# Patient Record
Sex: FEMALE | Race: WHITE | NOT HISPANIC OR LATINO | URBAN - METROPOLITAN AREA
[De-identification: names, ages, dates, MRNs, and addresses within clinical notes are randomized per-mention and may not be internally consistent; named-entity substitution may affect disease eponyms.]

---

## 2019-11-19 ENCOUNTER — OUTPATIENT (OUTPATIENT)
Dept: OUTPATIENT SERVICES | Facility: HOSPITAL | Age: 19
LOS: 1 days | Discharge: HOME | End: 2019-11-19

## 2019-12-12 DIAGNOSIS — F43.10 POST-TRAUMATIC STRESS DISORDER, UNSPECIFIED: ICD-10-CM

## 2019-12-19 DIAGNOSIS — G47.00 INSOMNIA, UNSPECIFIED: ICD-10-CM

## 2020-09-17 ENCOUNTER — OUTPATIENT (OUTPATIENT)
Dept: OUTPATIENT SERVICES | Facility: HOSPITAL | Age: 20
LOS: 1 days | Discharge: HOME | End: 2020-09-17

## 2020-09-18 DIAGNOSIS — F43.10 POST-TRAUMATIC STRESS DISORDER, UNSPECIFIED: ICD-10-CM

## 2020-09-18 DIAGNOSIS — G47.00 INSOMNIA, UNSPECIFIED: ICD-10-CM

## 2021-02-04 ENCOUNTER — EMERGENCY (EMERGENCY)
Facility: HOSPITAL | Age: 21
LOS: 1 days | Discharge: HOME | End: 2021-02-04
Attending: EMERGENCY MEDICINE | Admitting: EMERGENCY MEDICINE
Payer: COMMERCIAL

## 2021-02-04 VITALS
OXYGEN SATURATION: 100 % | HEART RATE: 97 BPM | SYSTOLIC BLOOD PRESSURE: 137 MMHG | TEMPERATURE: 98 F | DIASTOLIC BLOOD PRESSURE: 90 MMHG | RESPIRATION RATE: 18 BRPM

## 2021-02-04 VITALS
SYSTOLIC BLOOD PRESSURE: 127 MMHG | RESPIRATION RATE: 18 BRPM | DIASTOLIC BLOOD PRESSURE: 84 MMHG | HEART RATE: 101 BPM | OXYGEN SATURATION: 100 %

## 2021-02-04 DIAGNOSIS — F41.0 PANIC DISORDER [EPISODIC PAROXYSMAL ANXIETY]: ICD-10-CM

## 2021-02-04 PROCEDURE — 90792 PSYCH DIAG EVAL W/MED SRVCS: CPT

## 2021-02-04 PROCEDURE — 99284 EMERGENCY DEPT VISIT MOD MDM: CPT

## 2021-02-04 RX ORDER — CLONAZEPAM 1 MG
1 TABLET ORAL ONCE
Refills: 0 | Status: DISCONTINUED | OUTPATIENT
Start: 2021-02-04 | End: 2021-02-04

## 2021-02-04 RX ADMIN — Medication 2 MILLIGRAM(S): at 05:39

## 2021-02-04 RX ADMIN — Medication 1 MILLIGRAM(S): at 04:38

## 2021-02-04 NOTE — ED ADULT NURSE NOTE - CHIEF COMPLAINT QUOTE
Patient had a verbal argument with her roommate, drove herself to [facility] to talk to someone about her anxiety and was advised to go to [facility].  Patient drove away from the hospital and called 911.  EMS brought patient to hospital for anxiety.  She is emotional and anxious in ambay.  Denies SI/HI, hearing voices or hallucinations. PPatient had a verbal argument with her roommate, drove herself to [facility] to talk to someone about her anxiety and was advised to go to [facility].  Patient drove away from the hospital and called 911.  EMS brought patient to hospital for anxiety.  She is emotional and anxious in ambay.  Denies SI/HI, hearing voices or hallucinations.

## 2021-02-04 NOTE — ED ADULT NURSE NOTE - NSIMPLEMENTINTERV_GEN_ALL_ED
Implemented All Universal Safety Interventions:  [place] to call system. Call bell, personal items and telephone within reach. Instruct patient to call for assistance. Room bathroom lighting operational. Non-slip footwear when patient is off stretcher. Physically safe environment: no spills, clutter or unnecessary equipment. Stretcher in lowest position, wheels locked, appropriate side rails in place.

## 2021-02-04 NOTE — ED ADULT NURSE NOTE - OBJECTIVE STATEMENT
Receive pt in Trauma C, pt very anxious, pt states " I don't want to answer any questions that is going to let me stay here". As per  Triage  note, patient had a verbal argument with her roommate, drove herself to [facility] to talk to someone about her anxiety and was advised to go to [facility].  Patient drove away from the hospital and called 911.   Denies SI/HI, hearing voices or hallucinations." Pt. medicated for Klonopin. Receive pt in Trauma C, pt very anxious, pt states " I don't want to answer any questions that is going to let me stay here". As per  Triage  note, patient had a verbal argument with her roommate, drove herself to [facility] to talk to someone about her anxiety and was advised to go to [facility].  Patient drove away from the hospital and called 911.   Denies SI/HI, hearing voices or hallucinations." Pt. medicated for Klonopin. partial belonging placed in  the closet in front of room 21. partial valuables taken and sent to security. AS per Md Wan pt could keep her cellphone for now.

## 2021-02-04 NOTE — ED BEHAVIORAL HEALTH ASSESSMENT NOTE - RISK ASSESSMENT
pt is at moderate to low risk, and  not at imminent risk for self harm. Pt has risk factors including anxious state, prior sa, character pathology, hx of elf injurious behaviors, chronic si. Protective factors are: supportive network, engaged in treatment, help seeking behavior, compliant with treatment, no access to weapons, working, future oriented .  While pt is chornically at elevated risk fr self harm she currently is not a imminent danger and does not require psychiatric involuntary co Moderate Acute Suicide Risk pt is at moderate to low risk, and  not at imminent risk for self harm. Pt has risk factors including anxious state, prior sa, character pathology, hx of elf injurious behaviors, chronic si. Protective factors are: supportive network, engaged in treatment, help seeking behavior, compliant with treatment, no access to weapons, working, future oriented .  While pt is chronically at elevated risk fr self harm she currently is not a imminent danger and does not require psychiatric involuntary commitment .
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
## 2021-02-04 NOTE — ED BEHAVIORAL HEALTH ASSESSMENT NOTE - DESCRIPTION
pt is anxious pt is anxious, but overall in good behavioral control    Vital Signs Last 24 Hrs  T(C): 36.8 (04 Feb 2021 03:54), Max: 36.8 (04 Feb 2021 03:54)  T(F): 98.3 (04 Feb 2021 03:54), Max: 98.3 (04 Feb 2021 03:54)  HR: 101 (04 Feb 2021 05:43) (97 - 101)  BP: 127/84 (04 Feb 2021 05:43) (127/84 - 137/90)  BP(mean): --  RR: 18 (04 Feb 2021 05:43) (18 - 18)  SpO2: 100% (04 Feb 2021 05:43) (100% - 100%) none estranged from her family, lives with a roommate . Pt is a senior in college at [employer]

## 2021-02-04 NOTE — ED PROVIDER NOTE - PATIENT PORTAL LINK FT
You can access the FollowMyHealth Patient Portal offered by [facility] by registering at the following website: [email or web address]. By joining [facility]’s FollowMyHealth portal, you will also be able to view your health information using other applications (apps) compatible with our system.

## 2021-02-04 NOTE — ED BEHAVIORAL HEALTH ASSESSMENT NOTE - SUMMARY
Pt. 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 Pt  with  3 psychiatric admissions, the first when she was 15 in [place] following and SA via OD, the second at [facility] in 10/2018 after OD, and more recently in 2/2020 at [facility]. Pt. denied any other SA. Pt. reported NSSIB of cutting on and off since high school and last cut couple of weeks ago. Pt. reported that she is in treatment with Dr. Perez and therapist Cathy Smith and currently prescribed quetiapine, effexor, Prazosin, and Klonopin. Patient was seen at [facility] in 2019 for medication refill and  then again in  in September, 2020  for si . Patient bib by EMS for anxiety . Pt. 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 Pt  with  3 psychiatric admissions, the first when she was 15 in [place] following and SA via OD, the second at [facility] in 10/2018 after OD, and more recently in 2/2020 at [facility]. Pt. denied any other SA. Pt. reported NSSIB of cutting on and off since high school and last cut couple of weeks ago. Pt. reported that she is in treatment with Dr. Perez and therapist Cathy Smith and currently prescribed quetiapine, effexor, Prazosin, and Klonopin. Patient was seen at [facility] in 2019 for medication refill and  then again in  in September, 2020  for si . Patient bib by EMS for anxiety .  On assessment pt presents  anxious initially , with rapid speech , tearful in the context of multiple stressors which precipitated in having si. Pt while in ED was medicated with Klonopin and Ativan PO for anxiety with good response . Once anxiety subsided she denied passive or active si . She was fernando to engage in safety plan and verbalized several  protective factors her work, her school . Pt declines voluntary admission , and did not meet criteria for involuntary hospitalization. pt was deemed safe for discharge with no imminent safety concern.

## 2021-02-04 NOTE — ED BEHAVIORAL HEALTH ASSESSMENT NOTE - HPI (INCLUDE ILLNESS QUALITY, SEVERITY, DURATION, TIMING, CONTEXT, MODIFYING FACTORS, ASSOCIATED SIGNS AND SYMPTOMS)
Pt. 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 Pt. reported 3 psychiatric admissions, the first when she was 15 in [place] following and SA via OD, the second at [facility] in 10/2018 after OD, and more   recently in 2/2020 at [facility]. Pt. denied any other SA. Pt. reported NSSIB of cutting on and off since high   school and last cut 3 days ago. Pt. reported that she is in treatment with Dr. Perez and therapist Cathy Smith   and currently prescribed quetiapine, effexor, prazosin, and klonopin. Pt. reported that her psychiatrist stated   that her symptoms are personality related and that he has made many medications changes and will not make   anymore. Pt. presented to the [facility] today for treatment as she reported that depression and anxiety have   worsened. Pt. reported SI daily with plans of OD or jumping off a bridge but she denied all intent and listed   her friends as protective factors. Pt .completed safety plan.  Pt. declined admissions to inpatient services. Pt.   denied AH,VH, mikayla. Pt. denied any HI,I,P    as per Elizabeth Rosales, knows the pt for a long time , live together. Pt has panic d/o, and at times gets heightened. Patient is in treatment with therapist  and psychiatrist. She feels pt tends to get more anxious on the days when pt has therapy sessions and does not think Klonopin control and feels pt was seeking for medications fix . Patient relies heavily on Elizabeth's support and they got into an argument, over Elizabeth's boyfriend and afraid that he is pulling her away from the pt. Patient has hx of si , but no recent verbalization of suicidal thoughts. No significant changes in her daily activities or functioning , pt has been working and seems busy with paper work.  She reports pt has hx of hospitalizations , not sure how many, but most recent at [facility] for 2 days for anxiety . Dr. Perez .  Patient vapes daily, denies any drinking. No acces to guns.    She reports has childhood trauma. Pt. 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 Pt  with  3 psychiatric admissions, the first when she was 15 in [place] following and SA via OD, the second at [facility] in 10/2018 after OD, and more recently in 2/2020 at [facility]. Pt. denied any other SA. Pt. reported NSSIB of cutting on and off since high school and last cut couple of weeks ago. Pt. reported that she is in treatment with Dr. Perez and therapist Cathy Smith and currently prescribed quetiapine, effexor, Prazosin, and Klonopin. Patient was seen at [facility] in 2019 for medication refill and  then again in  in September, 2020  for si . Patient bib by EMS for anxiety .    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 Patient reports she is under a lot of stress inclduing school , missed her deadline due to issues with financial aid, and having difficult time in therapy due to intense sessions in therapy. Patient also reports her roommate, Elizabeth, whom she considers as  mother figure was having a hard time with the pt and asked  to take a break from her and withdrew herself . Pt admits she is attached to Elizabeth and fearful of loosing her as Elizabeth has a boyfriend now and feels he is pulling her away from the pt. Patient reports she left the house after having an argument with Elizabeth , and was driving around for 2 hours and could not calm herself down, she drove to [facility] and asked the  where she can be seen and they called EMS. Now that she is here she feels more anxious and she does not want to be admitted, she states she had many of these kind of melt downs and susually would just get something in ER and was able to go home. She states Klonopin 0.5mg does not help and is not enough when she has these anxiety attacks.  denied AH,VH, mikayla. Pt. denied any HI,I,P    as per Elizabeth Rosales, knows the pt for a long time , live together. Pt has panic d/o, and at times gets heightened. Patient is in treatment with therapist  and psychiatrist. She feels pt tends to get more anxious on the days when pt has therapy sessions and does not think Klonopin control and feels pt was seeking for medications fix . Patient relies heavily on Elizabeth's support and they got into an argument, over Elizabeth's boyfriend and afraid that he is pulling her away from the pt. Patient has hx of si , but no recent verbalization of suicidal thoughts. No significant changes in her daily activities or functioning , pt has been working and seems busy with paper work.  She reports pt has hx of hospitalizations , not sure how many, but most recent at [facility] for 2 days for anxiety . Dr. Perez .  Patient vapes daily, denies any drinking. No acces to guns.    She reports has childhood trauma. Pt. 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 Pt  with  3 psychiatric admissions, the first when she was 15 in [place] following and SA via OD, the second at [facility] in 10/2018 after OD, and more recently in 2/2020 at [facility]. Pt. denied any other SA. Pt. reported NSSIB of cutting on and off since high school and last cut couple of weeks ago. Pt. reported that she is in treatment with Dr. Perez and therapist Cathy Smith and currently prescribed quetiapine, effexor, Prazosin, and Klonopin. Patient was seen at [facility] in 2019 for medication refill and  then again in  in September, 2020  for si . Patient bib by EMS for anxiety .    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 Patient reports she is under a lot of stress including school , missed her deadline due to issues with financial aid, and having difficult time in therapy due to intense sessions in therapy. Today pt had increased anxiety and typically  her roommate, Elizabeth, whom she considers as  mother figure would be her support , but Elizabeth was having a hard time with the pt and asked  to take a break from her and withdrew herself today . Pt admits she is attached to Elizabeth and fearful of loosing her as Elizabeth has a boyfriend now and feels he is pulling her away from the pt. Patient reports she left the house after having an argument with Elizabeth , and was driving around for 2 hours and could not calm herself down, she drove to [facility] and asked the  where she can be seen and they called EMS. Now that she is here she feels more anxious and she does not want to be admitted,  She states Klonopin 0.5mg does not help and is not enough when she has these anxiety attacks. Patient reports she has chronic si , and has  increased si in he past 2 weeks due to multiple stressors . She has different plans in her  head but denies intent on acting on any of those plans and denies making any preparation at any point. Patient states she  does not have any active or passive si at this time . She was given Klonopin and Ativan and endorsed feeling better. She was seen 1/2 hour after the Ativan dose at which point she seemed a bit calmer, was trying to watch a movie and was less fidgety. Patient denies any si/i/p. She reports she has plenty of coping mechanisms including journaling, going to dance studio, writing , singing.  Pt also states she does not want to miss her dead lines for school and can't loose her job. Pt declines to psychiatric hospitalization and is able to safety plan.  Patient reports sad mood whenever she is anxious and overwhelmed, she has been eating , sleeping at her baseline . No significant changes in her energy level, denies anhedonia, denies hopelessness.  Pt  denied AH,VH, paranoia  mikayla. Pt. denied any HI,I,P. Pt reports she has flashback and nightmares and PTSD sx are persistent. Patient denies any active sx of eating d/o and has bene maintaining her weight.     as per Elizabeth Bobby, knows the pt for a long time , they live together. Pt has panic d/o, and at times the anxiety  gets heightened. Patient is in treatment with therapist  and psychiatrist. She feels pt tends to get more anxious on the days when pt has therapy sessions and does not think Klonopin is helping or enough when she has these episodes  . Patient relies heavily on Elizabeth's support and they got into an argument today , over Elizabeth's boyfriend and afraid that he is pulling her away from the pt. Patient has hx of si , but no recent verbalization of suicidal thoughts. No significant changes in her daily activities or functioning , pt has been working and seems busy with paper work for school .  She reports pt has hx of hospitalizations , not sure how many, but most recent at [facility] for 2 days for anxiety . Dr. Perez .  Patient vapes daily, denies any drinking. No acces to guns.    She reports has childhood trauma. Elizabeth expresses no safety concerns and wants to take pt home and does ot feel pt needs psychiatric admission . Pt. 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 Pt  with  3 psychiatric admissions, the first when she was 15 in [place] following and SA via OD, the second at [facility] in 10/2018 after OD, and more recently in 2/2020 at [facility]. Pt. denied any other SA. Pt. reported NSSIB of cutting on and off since high school and last cut couple of weeks ago. Pt. reported that she is in treatment with Dr. Perez and therapist Cathy Smith and currently prescribed quetiapine, effexor, Prazosin, and Klonopin. Patient was seen at [facility] in 2019 for medication refill and  then again in  in September, 2020  for si . Patient bib by EMS for anxiety .    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 Patient reports she is under a lot of stress including school , missed her deadline due to issues with financial aid, and having difficult time in therapy due to intense sessions in therapy. Today pt had increased anxiety and typically  her roommate, Elizabeth, whom she considers as  mother figure would be her support , but Elizabeth was having a hard time with the pt and asked  to take a break from her and withdrew herself today . Pt admits she is attached to Elizabeth and fearful of loosing her as Elizabeth has a boyfriend now and feels he is pulling her away from the pt. Patient reports she left the house after having an argument with Elizabeth , and was driving around for 2 hours and could not calm herself down, she drove to [facility] and asked the  where she can be seen and they called EMS. Now that she is here she feels more anxious and she does not want to be admitted,  She states Klonopin 0.5mg does not help and has no benefit when experiencing  these anxiety attacks. Patient reports she has chronic si , and has  increased si in he past 2 weeks due to multiple stressors . She has different plans in her  head including jumping [place], overdosing on Klonopin, hanging,   but denies intent on acting on any of those plans and denies making any preparation at any point. Patient states she  does not have any active or passive si at this time . She was given Klonopin and Ativan and endorsed feeling better. She was seen 1/2 hour after the Ativan dose at which point she seemed a bit calmer, was trying to watch a movie and was less fidgety. Patient denies any si/i/p. She reports she has plenty of coping mechanisms including journaling, going to dance studio, writing , singing.  Pt also states she does not want to miss her dead lines for school and can't loose her job. Pt declines to psychiatric hospitalization and is able to safety plan.  Patient reports sad mood whenever she is anxious and overwhelmed, she has been eating , sleeping at her baseline . No significant changes in her energy level, denies anhedonia, denies hopelessness.  Pt  denied AH,VH, paranoia  mikayla. Pt. denied any HI,I,P. Pt reports she has flashback and nightmares and PTSD sx are persistent. Patient denies any active sx of eating d/o and has bene maintaining her weight.     as per Elizabeth Bobby, knows the pt for a long time , they live together. Pt has panic d/o, and at times the anxiety  gets heightened. Patient is in treatment with therapist  and psychiatrist. She feels pt tends to get more anxious on the days when pt has therapy sessions and does not think Klonopin is helping or enough when she has these episodes  . Patient relies heavily on Elizabeth's support and they got into an argument today , over Elizabeth's boyfriend and afraid that he is pulling her away from the pt. Patient has hx of si , but no recent verbalization of suicidal thoughts. No significant changes in her daily activities or functioning , pt has been working and seems busy with paper work for school .  She reports pt has hx of hospitalizations , not sure how many, but most recent at [facility] for 2 days for anxiety . Dr. Perez .  Patient vapes daily, denies any drinking. No acces to guns.    She reports has childhood trauma. Elizabeth expresses no safety concerns and wants to take pt home and does ot feel pt needs psychiatric admission .

## 2021-02-04 NOTE — ED BEHAVIORAL HEALTH ASSESSMENT NOTE - NSSUICPROTFACT_PSY_ALL_CORE
Identifies reasons for living/Supportive social network of family or friends/Engaged in work or school/Positive therapeutic relationships

## 2021-02-04 NOTE — ED PROVIDER NOTE - OBJECTIVE STATEMENT
19 yo with a history of anxiety and depression presenting with severe anxiety that started tonight asking for a place to stay for 24 hours.  Has had a number of stressors at school relating to finances and her roommate.  States when this has happened in the past before school she would go to the hospital where she would stay for 24 hours and then go home when she felt better.  Reports that she always has thoughts of suicide and will not say if it is any worse than usual.  Denies any HI  VH.  Took her home klonopin dose of 0.5mg at 1700 with no improvement tonio

## 2021-02-04 NOTE — ED BEHAVIORAL HEALTH ASSESSMENT NOTE - OTHER
cvm Elizabeth Rosales : roommate/ friend Elizabeth rapid initially but slowed down once she was less anxious

## 2021-02-04 NOTE — ED PROVIDER NOTE - CLINICAL SUMMARY MEDICAL DECISION MAKING FREE TEXT BOX
pt with history of anxiety and depression.  Here with anxiety attack and SI (which she insists is something she has daily).  Based on her presentation I feel there might be a borderline component to her presentation>  As she will not further explain her comments with regards to being more suicidal today than other days I am going to have BH evaluate the patient for potential emergency hospitalization.  Will also provide benzos to help with her anxiety in the meantime.  Dispo after reassessment and  consult

## 2021-02-04 NOTE — ED PROVIDER NOTE - NSFOLLOWUPINSTRUCTIONS_ED_ALL_ED_FT
Anxiety    Generalized anxiety disorder (LELA) is a mental disorder. It is defined as anxiety that is not necessarily related to specific events or activities or is out of proportion to said events. Symptoms include restlessness, fatigue, difficulty concentrations, irritability and difficulty concentrating. It may interfere with life functions, including relationships, work, and school. If you were started on a medication, make sure to take exactly as prescribed and follow up with a psychiatrist.    SEEK IMMEDIATE MEDICAL CARE IF YOU HAVE ANY OF THE FOLLOWING SYMPTOMS: thoughts about hurting killing yourself, thoughts about hurting or killing somebody else, hallucinations, or worsening depression.

## 2021-02-04 NOTE — ED PROVIDER NOTE - PHYSICAL EXAMINATION
Gen: Well appearing very anxious crying and hyperventilating  Head: NC/AT  Neck: trachea midline  Resp:  No distress  Ext: no deformities  Neuro:  A&O appears non focal  Skin:  Warm and dry as visualized  Psych:  anxious crying +SI no HI AH VH

## 2021-02-04 NOTE — ED ADULT TRIAGE NOTE - CHIEF COMPLAINT QUOTE
Patient had a verbal argument with her roommate, drove herself to [facility] to talk to someone about her anxiety and was advised to go to [facility].  Patient drove away from the hospital and called 911.  EMS brought patient to hospital for anxiety.  She is emotional and anxious in ambay.  Denies SI/HI, hearing voices or hallucinations.

## 2023-09-25 NOTE — ED BEHAVIORAL HEALTH ASSESSMENT NOTE - OTHER PAST PSYCHIATRIC HISTORY (INCLUDE DETAILS REGARDING ONSET, COURSE OF ILLNESS, INPATIENT/OUTPATIENT TREATMENT)
Ftsg Text: Because of the full-thickness nature of the defect, to protect underlying structures, and to avoid distortion, a full-thickness skin graft was planned. After prep and local anesthesia, a template was made of the defect and the graft was harvested.  The graft was defatted and trimmed to fit the defect. It was sutured into place circumferentially and a tie-over Bolster dressing was applied.  The donor site was converted to a fusiform defect, and after hemostasis, was closed in a layered fashion. 3 hospitalizations, previous sa, hx of self injurious behavior. Pt is in treatment with psychiatrist and therapist

## 2024-08-20 NOTE — ED BEHAVIORAL HEALTH ASSESSMENT NOTE - SAFETY PLAN ADDT'L DETAILS
Levothyroxine 100 mg once a day, simvastatin 40 mg once a day, quetiapine 50 mg once a day,  iron pills
Safety plan discussed with.../Education provided regarding environmental safety / lethal means restriction/Provision of National Suicide Prevention Lifeline 7-169-859-QMMP (2993)